# Patient Record
Sex: FEMALE | Race: WHITE | NOT HISPANIC OR LATINO
[De-identification: names, ages, dates, MRNs, and addresses within clinical notes are randomized per-mention and may not be internally consistent; named-entity substitution may affect disease eponyms.]

---
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
## 2023-01-12 ENCOUNTER — APPOINTMENT (OUTPATIENT)
Dept: PEDIATRICS | Facility: CLINIC | Age: 18
End: 2023-01-12
Payer: COMMERCIAL

## 2023-01-12 VITALS
SYSTOLIC BLOOD PRESSURE: 108 MMHG | BODY MASS INDEX: 25.83 KG/M2 | DIASTOLIC BLOOD PRESSURE: 68 MMHG | WEIGHT: 155 LBS | HEART RATE: 84 BPM | TEMPERATURE: 97.2 F | HEIGHT: 65 IN | RESPIRATION RATE: 18 BRPM

## 2023-01-12 DIAGNOSIS — Z00.00 ENCOUNTER FOR GENERAL ADULT MEDICAL EXAMINATION W/OUT ABNORMAL FINDINGS: ICD-10-CM

## 2023-01-12 DIAGNOSIS — J02.9 ACUTE PHARYNGITIS, UNSPECIFIED: ICD-10-CM

## 2023-01-12 LAB — S PYO AG SPEC QL IA: NORMAL

## 2023-01-12 PROCEDURE — 92551 PURE TONE HEARING TEST AIR: CPT

## 2023-01-12 PROCEDURE — 99395 PREV VISIT EST AGE 18-39: CPT

## 2023-01-12 PROCEDURE — 96127 BRIEF EMOTIONAL/BEHAV ASSMT: CPT

## 2023-01-12 PROCEDURE — 96160 PT-FOCUSED HLTH RISK ASSMT: CPT | Mod: 59

## 2023-01-12 PROCEDURE — 87880 STREP A ASSAY W/OPTIC: CPT | Mod: QW

## 2023-01-12 RX ORDER — PANTOPRAZOLE 40 MG/1
40 TABLET, DELAYED RELEASE ORAL DAILY
Refills: 0 | Status: COMPLETED | COMMUNITY
Start: 2022-11-23 | End: 2023-01-12

## 2023-01-12 RX ORDER — MECLIZINE HCL 25 MG/1
25 TABLET ORAL
Refills: 0 | Status: COMPLETED | COMMUNITY
Start: 2022-11-23 | End: 2023-01-12

## 2023-01-12 RX ORDER — FAMOTIDINE 40 MG/1
40 TABLET, FILM COATED ORAL DAILY
Refills: 0 | Status: COMPLETED | COMMUNITY
Start: 2022-11-23 | End: 2023-01-12

## 2023-01-12 RX ORDER — MESALAMINE 1.2 G/1
1.2 TABLET, DELAYED RELEASE ORAL
Refills: 0 | Status: COMPLETED | COMMUNITY
Start: 2022-11-23 | End: 2023-01-12

## 2023-01-12 NOTE — RISK ASSESSMENT
[No Increased risk of SCA or SCD] : No Increased risk of SCA or SCD    [0] : 2) Feeling down, depressed, or hopeless: Not at all (0) [PHQ-2 Negative - No further assessment needed] : PHQ-2 Negative - No further assessment needed [[id number]] : 0 [Have you ever fainted, passed out or had an unexplained seizure suddenly and without warning, especially during exercise or in response] : Have you ever fainted, passed out or had an unexplained seizure suddenly and without warning, especially during exercise or in response to sudden loud noises such as doorbells, alarm clocks and ringing telephones? No [Have you ever had exercise-related chest pain or shortness of breath?] : Have you ever had exercise-related chest pain or shortness of breath? No [Has anyone in your immediate family (parents, grandparents, siblings) or other more distant relatives (aunts, uncles, cousins)  of heart] : Has anyone in your immediate family (parents, grandparents, siblings) or other more distant relatives (aunts, uncles, cousins)  of heart problems or had an unexpected sudden death before age 50 (This would include unexpected drownings, unexplained car accidents in which the relative was driving or sudden infant death syndrome.)? No [Are you related to anyone with hypertrophic cardiomyopathy or hypertrophic obstructive cardiomyopathy, Marfan syndrome, arrhythmogenic] : Are you related to anyone with hypertrophic cardiomyopathy or hypertrophic obstructive cardiomyopathy, Marfan syndrome, arrhythmogenic right ventricular cardiomyopathy, long QT syndrome, short QT syndrome, Brugada syndrome or catecholaminergic polymorphic ventricular tachycardia, or anyone younger than 50 years with a pacemaker or implantable defibrillator? No

## 2023-01-12 NOTE — DISCUSSION/SUMMARY
[FreeTextEntry1] : Routine visit for this child. Doing well. Diet discussed. Exercise discussed. Safety issues discussed. Development for age discussed. Immunizations are up to date. Routine blood work ordered. All questions answered.\par 17 year female here for well visit. Normal growth and development observed. Recommend balanced diet with all food groups. Brush teeth twice daily and recommend visiting dentist twice per year for cleaning. Maintain consistent daily routines and sleep schedule. Personal hygiene, puberty, and sexual health reviewed. Risky behaviors assessed. School progress discussed. Limit screen time to less than 2 hours per day. Encourage physical activity.  Return 1 year for routine well visit.\par I recommended that the patient participates in 60 minutes or more of physical activity a day.  As an older child, I encouraged structured physical activity when possible (participation in team or individual sports, or supervised exercise sessions). .  \par Sore throat today. Strep test negative. Supportive care.\par DIscussion on all of Sowmya's medical issues. Grandmother concerned about study abroad in [place] she is considering in August. Advised to see if sx have improved by spring/summer.

## 2023-01-12 NOTE — REVIEW OF SYSTEMS
[Sore Throat] : sore throat [Appetite Changes] : appetite changes [Negative] : Gastrointestinal [Constipation] : no constipation [Abdominal Pain] : no abdominal pain

## 2023-01-12 NOTE — HISTORY OF PRESENT ILLNESS
[Yes] : Patient goes to dentist yearly [Up to date] : Up to date [Eats meals with family] : eats meals with family [Has family members/adults to turn to for help] : has family members/adults to turn to for help [Is permitted and is able to make independent decisions] : Is permitted and is able to make independent decisions [Grade: ____] : Grade: [unfilled] [Normal Performance] : normal performance [Normal Behavior/Attention] : normal behavior/attention [Normal Homework] : normal homework [Eats regular meals including adequate fruits and vegetables] : eats regular meals including adequate fruits and vegetables [Drinks non-sweetened liquids] : drinks non-sweetened liquids  [Calcium source] : calcium source [Has friends] : has friends [At least 1 hour of physical activity a day] : at least 1 hour of physical activity a day [Uses safety belts/safety equipment] : uses safety belts/safety equipment  [Has peer relationships free of violence] : has peer relationships free of violence [Has ways to cope with stress] : has ways to cope with stress [Displays self-confidence] : displays self-confidence [Gets depressed, anxious, or irritable/has mood swings] : gets depressed, anxious, or irritable/has mood swings [With Teen] : teen [Normal] : normal [LMP: _____] : LMP: [unfilled] [Cycle Length: _____ days] : Cycle Length: [unfilled] days [Days of Bleeding: _____] : Days of bleeding: [unfilled] [Age of Menarche: ____] : Age of Menarche: [unfilled] [Has concerns about body or appearance] : has concerns about body or appearance [Has interests/participates in community activities/volunteers] : has interests/participates in community activities/volunteers. [No] : Patient has not had sexual intercourse. [Irregular menses] : no irregular menses [Sleep Concerns] : no sleep concerns [Screen time (except homework) less than 2 hours a day] : no screen time (except homework) less than 2 hours a day [Uses electronic nicotine delivery system] : does not use electronic nicotine delivery system [Exposure to electronic nicotine delivery system] : no exposure to electronic nicotine delivery system [Uses tobacco] : does not use tobacco [Exposure to tobacco] : no exposure to tobacco [Uses drugs] : does not use drugs  [Exposure to drugs] : no exposure to drugs [Drinks alcohol] : does not drink alcohol [Exposure to alcohol] : no exposure to alcohol [Impaired/distracted driving] : no impaired/distracted driving [Has problems with sleep] : does not have problems with sleep [Has thought about hurting self or considered suicide] : has not thought about hurting self or considered suicide [FreeTextEntry7] : [employer]SC[employer]  17 year female   here for routine visit.  [de-identified] : ongoing sx of nausea, light headed ness, chest pain [FreeTextEntry8] : bcp for 1 month sx have improved. being followed for possible endometriosis [FreeTextEntry3] : dance [de-identified] : followed by psychiatrist for anxiety on Lexapro [FreeTextEntry1] : Patient is here today for a routine well visit. Denies any new visits to specialists,ER visits, hospitalizations or serious injuries since last visit unless listed below.\megan Patient is followed by gastroenterology for multiple complaints.  She has had multiple studies done by different gastroenterologist as well as visits to the emergency room.  Work-up included CT scan CT scan with angiogram, LE Doppler study, ultrasound of the gallbladder, upper GI, HIDA scan,.  Work-up to date has not revealed a source for her symptoms.  Patient was also s/p cardiac ablation for WPW in August 2022.  Followed by cardiology.  Followed b psych and is currently on Lexapro.\megan COntinues to have intermittent light headedness,nausea and chest pain which has been diagnosed as costochondritis.\megan Recently had a colonoscopy which was negative. Head CT scan for headache which was negative and diagnosed with migraine.\megan Also followed by GYN for ?? endometriosis due to painful and heavy periods. Now sx have improved while on the BCP.\megan Has tried Acupuncture and Reke with no improvement

## 2023-01-16 LAB — BACTERIA THROAT CULT: NORMAL

## 2023-01-18 NOTE — REVIEW OF SYSTEMS
Subjective:       Patient ID: Rl Grubbs is a 47 y.o. male.    Chief Complaint: palate ulcer    HPI    Rl Grubbs is a 47 year old male who presents to the head and neck clinic for a lesion on his palate. He first developed globus sensation this weekend. He had mild dysphagia. Today he noticed an ulcer to his palate above his uvula. It is mildly tender. There is no bleeding. No other lesions. He has no throat pain. He does have chronic PND and frequent sinusitis. He takes Zyrtec daily.     He has a known history of sleep apnea and uses a CPAP machine. He is interested in pursuing other treatment options for sleep apnea.    No past medical history on file.    Past Surgical History:   Procedure Laterality Date    CHOLECYSTECTOMY  7-28-21    HERNIA REPAIR  7-    ROBOT-ASSISTED CHOLECYSTECTOMY USING DA LORENE XI N/A 07/28/2021    Procedure: XI ROBOTIC CHOLECYSTECTOMY;  Surgeon: Brett Gastelum MD;  Location: [facility] OR [address] [place] [address];  Service: General;  Laterality: N/A;    ROBOT-ASSISTED LAPAROSCOPIC REPAIR OF EPIGASTRIC HERNIA N/A 07/28/2021    Procedure: ROBOTIC REPAIR, HERNIA, EPIGASTRIC;  Surgeon: Brett Gastelum MD;  Location: [facility] OR [address] [place] [address];  Service: General;  Laterality: N/A;  Incarcerated visceral hernia repair           Current Outpatient Medications:     buPROPion (WELLBUTRIN XL) 300 MG 24 hr tablet, Take 1 tablet (300 mg total) by mouth once daily., Disp: 90 tablet, Rfl: 3    cetirizine (ZYRTEC) 10 MG tablet, Take 10 mg by mouth once daily., Disp: , Rfl:     esomeprazole (NEXIUM) 40 MG capsule, TAKE 1 CAPSULE(40 MG) BY MOUTH EVERY DAY, Disp: 90 capsule, Rfl: 3    fenofibrate (TRICOR) 145 MG tablet, Take 1 tablet (145 mg total) by mouth once daily., Disp: 90 tablet, Rfl: 3    fluticasone propionate (FLONASE) 50 mcg/actuation nasal spray, 2 sprays (100 mcg total) by Each Nostril route once daily., Disp: 16 g, Rfl: 12    ibuprofen (ADVIL,MOTRIN) 800 MG tablet, Take 1 tablet (800 mg total) by  mouth every 6 (six) hours as needed for Pain., Disp: 60 tablet, Rfl: 0    irbesartan (AVAPRO) 300 MG tablet, TAKE 1 TABLET(300 MG) BY MOUTH EVERY DAY, Disp: 90 tablet, Rfl: 3    methylPREDNISolone (MEDROL DOSEPACK) 4 mg tablet, use as directed, Disp: 21 each, Rfl: 0    omega 3-dha-epa-fish oil 1,000 mg (120 mg-180 mg) Cap, Take 1,000 mg by mouth once daily., Disp: , Rfl:     Review of patient's allergies indicates:  No Known Allergies    Social History     Socioeconomic History    Marital status:    Tobacco Use    Smoking status: Never    Smokeless tobacco: Never   Substance and Sexual Activity    Alcohol use: Yes     Alcohol/week: 6.0 standard drinks     Types: 6 Cans of beer per week     Comment: Socially    Drug use: Never    Sexual activity: Yes     Partners: Female     Birth control/protection: Post-menopausal, None     Social Determinants of Health     Financial Resource Strain: Low Risk     Difficulty of Paying Living Expenses: Not hard at all   Food Insecurity: No Food Insecurity    Worried About Running Out of Food in the Last Year: Never true    Ran Out of Food in the Last Year: Never true   Transportation Needs: No Transportation Needs    Lack of Transportation (Medical): No    Lack of Transportation (Non-Medical): No   Physical Activity: Sufficiently Active    Days of Exercise per Week: 3 days    Minutes of Exercise per Session: 60 min   Stress: Stress Concern Present    Feeling of Stress : To some extent   Social Connections: Unknown    Frequency of Communication with Friends and Family: Twice a week    Frequency of Social Gatherings with Friends and Family: Once a week    Active Member of Clubs or Organizations: No    Attends Club or Organization Meetings: Patient refused    Marital Status:    Housing Stability: Low Risk     Unable to Pay for Housing in the Last Year: No    Number of Places Lived in the Last Year: 1    Unstable Housing in the Last Year: No       Family History   Problem  Relation Age of Onset    Asthma Mother     COPD Mother     Hearing loss Mother     Heart disease Mother     Hypertension Mother     Kidney disease Mother     Cancer Father     Heart disease Brother     Hypertension Brother     Diabetes Brother          Review of Systems   Constitutional:  Negative for appetite change, chills, diaphoresis, fatigue, fever and unexpected weight change.   HENT:  Positive for mouth sores and trouble swallowing. Negative for nasal congestion, dental problem, drooling, ear discharge, ear pain, facial swelling, hearing loss, nosebleeds, postnasal drip, rhinorrhea, sinus pressure/congestion, sneezing, sore throat, tinnitus and voice change.    Eyes: Negative.  Negative for pain, discharge, redness and itching.   Respiratory:  Negative for cough and shortness of breath.    Cardiovascular: Negative.  Negative for chest pain.   Gastrointestinal:  Negative for abdominal distention, abdominal pain, diarrhea, nausea and vomiting.   Endocrine: Negative.  Negative for cold intolerance and heat intolerance.   Genitourinary: Negative.  Negative for difficulty urinating.   Musculoskeletal:  Positive for back pain. Negative for neck pain and neck stiffness.   Integumentary:  Negative for rash. Negative.   Neurological: Negative.  Negative for dizziness, weakness and headaches.   Hematological: Negative.  Negative for adenopathy.   Psychiatric/Behavioral:  The patient is nervous/anxious.        Objective:      Physical Exam  Vitals reviewed.   Constitutional:       General: He is not in acute distress.     Appearance: He is well-developed. He is not ill-appearing or diaphoretic.   HENT:      Head: Normocephalic and atraumatic.      Jaw: No trismus.      Right Ear: Hearing, tympanic membrane, ear canal and external ear normal.      Left Ear: Hearing, tympanic membrane, ear canal and external ear normal.      Nose: Nose normal. No nasal deformity, mucosal edema or rhinorrhea.      Right Sinus: No maxillary  sinus tenderness or frontal sinus tenderness.      Left Sinus: No maxillary sinus tenderness or frontal sinus tenderness.      Mouth/Throat:      Lips: Pink. No lesions.      Mouth: Mucous membranes are moist. Mucous membranes are not pale, not dry and not cyanotic. No oral lesions.      Dentition: Normal dentition. Does not have dentures. No dental caries.      Tongue: No lesions. Tongue does not deviate from midline.      Palate: No mass and lesions.      Pharynx: Uvula midline. No pharyngeal swelling, oropharyngeal exudate, posterior oropharyngeal erythema or uvula swelling.      Tonsils: No tonsillar abscesses.     Eyes:      General: No scleral icterus.        Right eye: No discharge.         Left eye: No discharge.      Conjunctiva/sclera: Conjunctivae normal.   Neck:      Thyroid: No thyroid mass or thyromegaly.      Vascular: No JVD.      Trachea: Trachea and phonation normal. No tracheal tenderness or tracheal deviation.      Comments: Salivary glands - there are no lesions or asymmetric findings in the submandibular or parotid glands    Cardiovascular:      Rate and Rhythm: Normal rate.   Pulmonary:      Effort: Pulmonary effort is normal. No respiratory distress.      Breath sounds: No stridor.   Musculoskeletal:      Cervical back: Normal range of motion and neck supple.   Lymphadenopathy:      Head:      Right side of head: No submental, submandibular, tonsillar or preauricular adenopathy.      Left side of head: No submental, submandibular, tonsillar or preauricular adenopathy.      Cervical: No cervical adenopathy.   Skin:     General: Skin is warm and dry.      Coloration: Skin is not pale.      Findings: No erythema or rash.   Neurological:      Mental Status: He is alert and oriented to person, place, and time.      Cranial Nerves: No cranial nerve deficit.   Psychiatric:         Behavior: Behavior normal. Behavior is cooperative.         Thought Content: Thought content normal.        Assessment/Plan:       Problem List Items Addressed This Visit          ENT    Post-nasal drip     Flonase prescribed.         Ulcer of soft palate     Benign appearing, medrol dose pack prescribed. RTC in 3 weeks if ulcer persists.            GI    Gastroesophageal reflux disease - Primary     Continue Nexium, consider GI follow up if breakthrough reflux persists.              [Change in Activity] : no change in activity [Fever] : no fever [Wgt Loss (___ Lbs)] : no recent weight loss [Eye Discharge] : no eye discharge [Redness] : no redness [Swollen Eyelids] : no swollen eyelids [Change in Vision] : no change in vision  [Nasal Stuffiness] : no nasal congestion [Sore Throat] : no sore throat [Earache] : no earache [Nosebleeds] : no epistaxis [Cyanosis] : no cyanosis [Edema] : no edema [Diaphoresis] : not diaphoretic [Exercise Intolerance] : no persistence of exercise intolerance [Chest Pain] : no chest pain or discomfort [Palpitations] : no palpitations [Tachypnea] : not tachypneic [Wheezing] : no wheezing [Cough] : no cough [Shortness of Breath] : no shortness of breath [Change in Appetite] : no change in appetite [Vomiting] : no vomiting [Diarrhea] : no diarrhea [Abdominal Pain] : no abdominal pain [Constipation] : no constipation [Fainting (Syncope)] : no fainting [Seizure] : no seizures [Headache] : no headache [Dizziness] : no dizziness [Limping] : no limping [Joint Pains] : no arthralgias [Joint Swelling] : no joint swelling [Back Pain] : ~T no back pain [Muscle Aches] : no muscle aches [Rash] : no rash [Insect Bites] : no insect bites [Skin Lesions] : no skin lesions [Bruising] : no tendency for easy bruising [Swollen Glands] : no lymphadenopathy [Sleep Disturbances] : ~T no sleep disturbances [Hyperactive] : no hyperactive behavior [Emotional Problems] : no ~T emotional problems [Change In Personality] : ~T no personality change [Dec Urine Output] : no oliguria [Urinary Frequency] : no change in urinary frequency [Pain During Urination (Dysuria)] : no dysuria [Vaginal Discharge] : no vaginal discharge [Pubertal Concerns] : no pubertal concerns [Delayed Menarche] : no delayed menarche [Irregular Periods] : no irregular periods

## 2023-03-15 ENCOUNTER — NON-APPOINTMENT (OUTPATIENT)
Age: 18
End: 2023-03-15

## 2023-03-15 LAB
ALBUMIN SERPL ELPH-MCNC: 4.9 G/DL
ALP BLD-CCNC: 84 U/L
ALT SERPL-CCNC: 10 U/L
ANION GAP SERPL CALC-SCNC: 14 MMOL/L
APPEARANCE: ABNORMAL
AST SERPL-CCNC: 17 U/L
BACTERIA: NEGATIVE
BASOPHILS # BLD AUTO: 0.05 K/UL
BASOPHILS NFR BLD AUTO: 0.6 %
BILIRUB SERPL-MCNC: 0.6 MG/DL
BILIRUBIN URINE: NEGATIVE
BLOOD URINE: ABNORMAL
BUN SERPL-MCNC: 10 MG/DL
CALCIUM SERPL-MCNC: 9.9 MG/DL
CHLORIDE SERPL-SCNC: 104 MMOL/L
CHOLEST SERPL-MCNC: 173 MG/DL
CO2 SERPL-SCNC: 21 MMOL/L
COLOR: YELLOW
CREAT SERPL-MCNC: 0.88 MG/DL
EGFR: 97 ML/MIN/1.73M2
EOSINOPHIL # BLD AUTO: 0.11 K/UL
EOSINOPHIL NFR BLD AUTO: 1.2 %
GLUCOSE QUALITATIVE U: NEGATIVE
GLUCOSE SERPL-MCNC: 123 MG/DL
HCT VFR BLD CALC: 39.2 %
HDLC SERPL-MCNC: 36 MG/DL
HGB BLD-MCNC: 13.1 G/DL
HYALINE CASTS: 0 /LPF
IMM GRANULOCYTES NFR BLD AUTO: 0.4 %
KETONES URINE: NORMAL
LDLC SERPL CALC-MCNC: 114 MG/DL
LEUKOCYTE ESTERASE URINE: ABNORMAL
LYMPHOCYTES # BLD AUTO: 2.86 K/UL
LYMPHOCYTES NFR BLD AUTO: 32 %
MAN DIFF?: NORMAL
MCHC RBC-ENTMCNC: 30.3 PG
MCHC RBC-ENTMCNC: 33.4 GM/DL
MCV RBC AUTO: 90.5 FL
MICROSCOPIC-UA: NORMAL
MONOCYTES # BLD AUTO: 0.49 K/UL
MONOCYTES NFR BLD AUTO: 5.5 %
NEUTROPHILS # BLD AUTO: 5.38 K/UL
NEUTROPHILS NFR BLD AUTO: 60.3 %
NITRITE URINE: NEGATIVE
NONHDLC SERPL-MCNC: 138 MG/DL
PH URINE: 6
PLATELET # BLD AUTO: 397 K/UL
POTASSIUM SERPL-SCNC: 4.6 MMOL/L
PROT SERPL-MCNC: 7.7 G/DL
PROTEIN URINE: NORMAL
RBC # BLD: 4.33 M/UL
RBC # FLD: 12.8 %
RED BLOOD CELLS URINE: 3 /HPF
SODIUM SERPL-SCNC: 140 MMOL/L
SPECIFIC GRAVITY URINE: 1.03
SQUAMOUS EPITHELIAL CELLS: 9 /HPF
T4 SERPL-MCNC: 9.8 UG/DL
TRIGL SERPL-MCNC: 117 MG/DL
TSH SERPL-ACNC: 1.24 UIU/ML
URINE COMMENTS: NORMAL
UROBILINOGEN URINE: NORMAL
WBC # FLD AUTO: 8.93 K/UL
WHITE BLOOD CELLS URINE: 13 /HPF

## 2023-03-20 ENCOUNTER — NON-APPOINTMENT (OUTPATIENT)
Age: 18
End: 2023-03-20

## 2023-04-04 ENCOUNTER — NON-APPOINTMENT (OUTPATIENT)
Age: 18
End: 2023-04-04

## 2023-04-04 ENCOUNTER — APPOINTMENT (OUTPATIENT)
Dept: PEDIATRICS | Facility: CLINIC | Age: 18
End: 2023-04-04
Payer: COMMERCIAL

## 2023-04-04 DIAGNOSIS — J01.10 ACUTE FRONTAL SINUSITIS, UNSPECIFIED: ICD-10-CM

## 2023-04-04 DIAGNOSIS — J32.9 CHRONIC SINUSITIS, UNSPECIFIED: ICD-10-CM

## 2023-04-04 DIAGNOSIS — R09.81 NASAL CONGESTION: ICD-10-CM

## 2023-04-04 DIAGNOSIS — R51.9 HEADACHE, UNSPECIFIED: ICD-10-CM

## 2023-04-04 DIAGNOSIS — J34.89 NASAL CONGESTION: ICD-10-CM

## 2023-04-04 PROCEDURE — 99442: CPT

## 2023-04-06 ENCOUNTER — NON-APPOINTMENT (OUTPATIENT)
Age: 18
End: 2023-04-06

## 2023-04-08 ENCOUNTER — APPOINTMENT (OUTPATIENT)
Dept: PEDIATRICS | Facility: CLINIC | Age: 18
End: 2023-04-08

## 2023-05-13 ENCOUNTER — APPOINTMENT (OUTPATIENT)
Dept: PEDIATRICS | Facility: CLINIC | Age: 18
End: 2023-05-13
Payer: COMMERCIAL

## 2023-05-13 VITALS — TEMPERATURE: 98.6 F | HEART RATE: 79 BPM | SYSTOLIC BLOOD PRESSURE: 112 MMHG | DIASTOLIC BLOOD PRESSURE: 78 MMHG

## 2023-05-13 DIAGNOSIS — R51.9 HEADACHE, UNSPECIFIED: ICD-10-CM

## 2023-05-13 PROCEDURE — 99213 OFFICE O/P EST LOW 20 MIN: CPT

## 2023-05-13 RX ORDER — NORETHINDRONE ACETATE 5 MG/1
5 TABLET ORAL DAILY
Qty: 30 | Refills: 0 | Status: ACTIVE | COMMUNITY
Start: 2023-05-13

## 2023-05-13 NOTE — DISCUSSION/SUMMARY
[FreeTextEntry1] : This is a [age]yearsFormEnd who has headache on and off for the last 3 weeks describes pain as a dull ache over frontal area to top of head Mom has been doing physical therapy with her due to some laxity of her muscles.\par Patient had history of 1 migraine in the past this episode does not sound like migraine headache.  Patient has been taking Tylenol for pain.\par To change from Tylenol to Motrin or Advil for headache pain\par Recommend keeping headache diary advised if headache continues to persist will need neurology evaluation..\par Examination today within normal limits except for boggy nasal mucosa advised to start Nasonex as directed by allergy immunology and also to try Allegra-D or Claritin-D for decongestion.\par Answered all mom's questions\par Total time dedicated to this patient visit , including preparing to see the patient ( eg.. Review of chart, any pertinent labs ect  ) obtaining and/ or  reviewing separately obtained history, performing medical exam,  evaluation, counseling and educating patient and family member, ordering any needed medication or labs and documenting clinical information in  the electronic medical record to patient / parent ____20__ minutes.\par .

## 2023-05-13 NOTE — HISTORY OF PRESENT ILLNESS
[FreeTextEntry6] : 16 y/o present with headache X 1 month . Afebrile.\par Patient has returned from college states that she has had headache pretty much daily for the last 3 weeks.\par Has history of migraine where she had difficulty with vision and numbness to side of her face she had CT scan done which was negative patient received Compazine Tylenol and Benadryl in the emergency room headache to this extent has not reoccurred.\par Patient states that she has no nausea or vomiting no photophobia\par Will some tightness on the top of her neck and head.\par Patient saw allergy immunology work-up was negative as per mother patient was recommended to start on Nasonex nasal spray but the has not done this of yet.  Stated she has no allergies

## 2023-05-13 NOTE — PHYSICAL EXAM
[Clear] : right tympanic membrane clear [Inflamed Nasal Mucosa] : inflamed nasal mucosa [NL] : warm, clear [FreeTextEntry5] : Pupils are equal and reactive [FreeTextEntry4] : Patient noted to have some erythema septum there is no discharge noted

## 2023-05-18 ENCOUNTER — APPOINTMENT (OUTPATIENT)
Dept: PEDIATRICS | Facility: CLINIC | Age: 18
End: 2023-05-18
Payer: COMMERCIAL

## 2023-05-18 VITALS — TEMPERATURE: 98.2 F

## 2023-05-18 PROCEDURE — 90715 TDAP VACCINE 7 YRS/> IM: CPT

## 2023-05-18 PROCEDURE — 90471 IMMUNIZATION ADMIN: CPT

## 2023-05-18 NOTE — HISTORY OF PRESENT ILLNESS
[Tdap] : Tdap [FreeTextEntry1] : 17 year girl is here today for immunization update. patient is doing well. Vaccine listed discussed and given. VIS given. Possible side effects discussed. Received\par tdap

## 2023-05-18 NOTE — DISCUSSION/SUMMARY
[] : The components of the vaccine(s) to be administered today are listed in the plan of care. The disease(s) for which the vaccine(s) are intended to prevent and the risks have been discussed with the caretaker.  The risks are also included in the appropriate vaccination information statements which have been provided to the patient's caregiver.  The caregiver has given consent to vaccinate. [FreeTextEntry1] : 17 year girl is here today for immunization update. patient is doing well. Vaccine listed discussed and given. VIS given. Possible side effects discussed. Received\par tdap

## 2023-07-03 ENCOUNTER — APPOINTMENT (OUTPATIENT)
Dept: PEDIATRICS | Facility: CLINIC | Age: 18
End: 2023-07-03

## 2023-11-28 ENCOUNTER — APPOINTMENT (OUTPATIENT)
Dept: PEDIATRICS | Facility: CLINIC | Age: 18
End: 2023-11-28

## 2023-12-08 ENCOUNTER — APPOINTMENT (OUTPATIENT)
Dept: PEDIATRICS | Facility: CLINIC | Age: 18
End: 2023-12-08
Payer: COMMERCIAL

## 2023-12-08 VITALS — TEMPERATURE: 97.8 F

## 2023-12-08 DIAGNOSIS — R51.9 HEADACHE, UNSPECIFIED: ICD-10-CM

## 2023-12-08 DIAGNOSIS — J34.89 OTHER SPECIFIED DISORDERS OF NOSE AND NASAL SINUSES: ICD-10-CM

## 2023-12-08 PROCEDURE — 99214 OFFICE O/P EST MOD 30 MIN: CPT

## 2023-12-10 ENCOUNTER — NON-APPOINTMENT (OUTPATIENT)
Age: 18
End: 2023-12-10

## 2023-12-15 ENCOUNTER — APPOINTMENT (OUTPATIENT)
Dept: PEDIATRICS | Facility: CLINIC | Age: 18
End: 2023-12-15

## 2024-03-09 ENCOUNTER — APPOINTMENT (OUTPATIENT)
Dept: PEDIATRICS | Facility: CLINIC | Age: 19
End: 2024-03-09
Payer: COMMERCIAL

## 2024-03-09 VITALS — TEMPERATURE: 97.9 F

## 2024-03-09 DIAGNOSIS — J45.991 COUGH VARIANT ASTHMA: ICD-10-CM

## 2024-03-09 DIAGNOSIS — H65.01 ACUTE SEROUS OTITIS MEDIA, RIGHT EAR: ICD-10-CM

## 2024-03-09 PROCEDURE — 99214 OFFICE O/P EST MOD 30 MIN: CPT

## 2024-03-09 RX ORDER — FLUTICASONE PROPIONATE AND SALMETEROL 50; 500 UG/1; UG/1
500-50 POWDER RESPIRATORY (INHALATION) DAILY
Refills: 0 | Status: ACTIVE | COMMUNITY
Start: 2024-03-09

## 2024-03-09 RX ORDER — AMOXICILLIN AND CLAVULANATE POTASSIUM 875; 125 MG/1; MG/1
875-125 TABLET, COATED ORAL
Qty: 20 | Refills: 0 | Status: ACTIVE | COMMUNITY
Start: 2024-03-09 | End: 1900-01-01

## 2024-03-09 RX ORDER — CEFDINIR 300 MG/1
300 CAPSULE ORAL
Qty: 20 | Refills: 0 | Status: DISCONTINUED | COMMUNITY
Start: 2023-04-04 | End: 2024-03-09

## 2024-03-09 RX ORDER — ALBUTEROL SULFATE 90 UG/1
108 (90 BASE) INHALANT RESPIRATORY (INHALATION)
Refills: 0 | Status: ACTIVE | COMMUNITY
Start: 2024-03-09

## 2024-03-09 NOTE — HISTORY OF PRESENT ILLNESS
[FreeTextEntry6] : 19 y/o presents with pain in her right ear X 7 days, Afebrile. While away at college she was started on Augmentin (she believes it was 500 mg) twice daily x 7 days, she was feeling better until she finished the medication and her ear pain came right back. They then put her on Omnicef 300 mg BID and she is about 7 days into it with no relief of ear pain. It wakes her up at night. She had sinus balloon surgery in January. She is home this week and will be following up with ENT as well as pulmonologist. She was diagnosed with asthma, she is on advair and albuterol, also was placed on a prednisone taper for her cough. She took 30 mg today and has 2 more days of the taper left. She has not had fever.

## 2024-03-09 NOTE — DISCUSSION/SUMMARY
[FreeTextEntry1] : 18 year female with right AOM.  Complete high dose Augmentin BID x 10 days as prescribed. Provide antipyretics as needed for pain or fever.  Encourage fluids and rest.  If no improvement or symptoms worsen within 48 hours return for re-evaluation. Return to office for follow up in 2-3 wks. Follow up with ENT next week as well as pulmonologist. Continue advair, albuterol, prednisone. May consider a decongestant to help with sinus pressure.  Total time dedicated to this patient's visit includes preparing to see patient (reviewing chart, any pertinent labs/consults, etc.), obtaining and/or reviewing separately obtained history from patient and parent, performing medical examination, evaluation, counseling and educating patient/parent, ordering any needed medications and/or labs, documenting clinical information in the electronic record, reviewing any results subsequent to the orders placed during visit and discussing with patient/parent. Total time spent: 30 minutes.

## 2024-03-09 NOTE — REVIEW OF SYSTEMS
[Fever] : no fever [Ear Pain] : ear pain [Nasal Congestion] : nasal congestion [Sinus Pressure] : sinus pressure [Cough] : cough [Negative] : Genitourinary

## 2024-03-09 NOTE — PHYSICAL EXAM
[Tenderness] : tenderness [Clear] : left tympanic membrane clear [Erythema] : erythema [Purulent Effusion] : purulent effusion [Hypertrophied Nasal Mucosa] : hypertrophied nasal mucosa [NL] : regular rate and rhythm, normal S1, S2 audible, no murmurs [FreeTextEntry2] : frontal sinus tenderness

## 2024-05-29 ENCOUNTER — APPOINTMENT (OUTPATIENT)
Dept: PEDIATRICS | Facility: CLINIC | Age: 19
End: 2024-05-29

## 2024-11-29 ENCOUNTER — OUTPATIENT (OUTPATIENT)
Dept: OUTPATIENT SERVICES | Facility: HOSPITAL | Age: 19
LOS: 1 days | End: 2024-11-29
Payer: COMMERCIAL

## 2024-11-29 ENCOUNTER — APPOINTMENT (OUTPATIENT)
Dept: PEDIATRICS | Facility: CLINIC | Age: 19
End: 2024-11-29
Payer: COMMERCIAL

## 2024-11-29 ENCOUNTER — APPOINTMENT (OUTPATIENT)
Dept: ULTRASOUND IMAGING | Facility: CLINIC | Age: 19
End: 2024-11-29
Payer: COMMERCIAL

## 2024-11-29 VITALS — WEIGHT: 176.4 LBS | TEMPERATURE: 98.7 F

## 2024-11-29 DIAGNOSIS — R22.1 LOCALIZED SWELLING, MASS AND LUMP, NECK: ICD-10-CM

## 2024-11-29 DIAGNOSIS — I88.9 NONSPECIFIC LYMPHADENITIS, UNSPECIFIED: ICD-10-CM

## 2024-11-29 DIAGNOSIS — R93.89 ABNORMAL FINDINGS ON DIAGNOSTIC IMAGING OF OTHER SPECIFIED BODY STRUCTURES: ICD-10-CM

## 2024-11-29 DIAGNOSIS — Z90.89 ACQUIRED ABSENCE OF OTHER ORGANS: Chronic | ICD-10-CM

## 2024-11-29 DIAGNOSIS — S32.2XXA FRACTURE OF COCCYX, INITIAL ENCOUNTER FOR CLOSED FRACTURE: Chronic | ICD-10-CM

## 2024-11-29 PROCEDURE — 76536 US EXAM OF HEAD AND NECK: CPT | Mod: 26

## 2024-11-29 PROCEDURE — 99214 OFFICE O/P EST MOD 30 MIN: CPT

## 2024-11-29 PROCEDURE — 76536 US EXAM OF HEAD AND NECK: CPT

## 2024-11-29 RX ORDER — DOXYCYCLINE HYCLATE 100 MG/1
100 TABLET ORAL
Qty: 14 | Refills: 0 | Status: ACTIVE | COMMUNITY
Start: 2024-11-29 | End: 1900-01-01

## 2024-11-30 ENCOUNTER — TRANSCRIPTION ENCOUNTER (OUTPATIENT)
Age: 19
End: 2024-11-30

## 2024-11-30 LAB
ALBUMIN SERPL ELPH-MCNC: 4.7 G/DL
ALP BLD-CCNC: 117 U/L
ALT SERPL-CCNC: 26 U/L
ANION GAP SERPL CALC-SCNC: 13 MMOL/L
AST SERPL-CCNC: 26 U/L
BASOPHILS # BLD AUTO: 0.05 K/UL
BASOPHILS NFR BLD AUTO: 0.5 %
BILIRUB SERPL-MCNC: 0.6 MG/DL
BUN SERPL-MCNC: 9 MG/DL
CALCIUM SERPL-MCNC: 9.7 MG/DL
CHLORIDE SERPL-SCNC: 104 MMOL/L
CO2 SERPL-SCNC: 23 MMOL/L
CREAT SERPL-MCNC: 0.74 MG/DL
CRP SERPL-MCNC: <3 MG/L
EBV EA AB SER IA-ACNC: 29.2 U/ML
EBV EA AB TITR SER IF: POSITIVE
EBV EA IGG SER QL IA: 507 U/ML
EBV EA IGG SER-ACNC: POSITIVE
EBV EA IGM SER IA-ACNC: NEGATIVE
EBV PATRN SPEC IB-IMP: NORMAL
EBV VCA IGG SER IA-ACNC: >750 U/ML
EBV VCA IGM SER QL IA: 34.1 U/ML
EGFR: 118 ML/MIN/1.73M2
EOSINOPHIL # BLD AUTO: 0.26 K/UL
EOSINOPHIL NFR BLD AUTO: 2.7 %
EPSTEIN-BARR VIRUS CAPSID ANTIGEN IGG: POSITIVE
ERYTHROCYTE [SEDIMENTATION RATE] IN BLOOD BY WESTERGREN METHOD: 27 MM/HR
GLUCOSE SERPL-MCNC: 109 MG/DL
HCT VFR BLD CALC: 35.4 %
HGB BLD-MCNC: 11.6 G/DL
IMM GRANULOCYTES NFR BLD AUTO: 0.4 %
LYMPHOCYTES # BLD AUTO: 2.68 K/UL
LYMPHOCYTES NFR BLD AUTO: 27.4 %
MAN DIFF?: NORMAL
MCHC RBC-ENTMCNC: 28.9 PG
MCHC RBC-ENTMCNC: 32.8 G/DL
MCV RBC AUTO: 88.3 FL
MONOCYTES # BLD AUTO: 0.76 K/UL
MONOCYTES NFR BLD AUTO: 7.8 %
NEUTROPHILS # BLD AUTO: 5.99 K/UL
NEUTROPHILS NFR BLD AUTO: 61.2 %
PLATELET # BLD AUTO: 402 K/UL
POTASSIUM SERPL-SCNC: 4.8 MMOL/L
PROT SERPL-MCNC: 7.4 G/DL
RBC # BLD: 4.01 M/UL
RBC # FLD: 14.6 %
SODIUM SERPL-SCNC: 141 MMOL/L
T4 FREE SERPL-MCNC: 1.2 NG/DL
THYROGLOB AB SERPL-ACNC: 15.5 IU/ML
THYROPEROXIDASE AB SERPL IA-ACNC: 13.7 IU/ML
WBC # FLD AUTO: 9.78 K/UL

## 2024-12-01 LAB
M PNEUMO IGG SER IA-ACNC: POSITIVE
M PNEUMO IGG SER QL IA: 5.17 INDEX
M PNEUMO IGM SER QL IA: 2.47 INDEX
MYCOPLASMA AG SPEC QL: POSITIVE

## 2024-12-02 ENCOUNTER — NON-APPOINTMENT (OUTPATIENT)
Age: 19
End: 2024-12-02

## 2024-12-05 LAB — TSH ESOTERIX: 1.5 UU/ML

## 2025-05-01 ENCOUNTER — APPOINTMENT (OUTPATIENT)
Dept: PEDIATRICS | Facility: CLINIC | Age: 20
End: 2025-05-01
Payer: COMMERCIAL

## 2025-05-01 VITALS — TEMPERATURE: 98.2 F

## 2025-05-01 DIAGNOSIS — J02.0 STREPTOCOCCAL PHARYNGITIS: ICD-10-CM

## 2025-05-01 LAB — S PYO AG SPEC QL IA: POSITIVE

## 2025-05-01 PROCEDURE — 99214 OFFICE O/P EST MOD 30 MIN: CPT

## 2025-05-01 PROCEDURE — 87880 STREP A ASSAY W/OPTIC: CPT | Mod: QW

## 2025-05-01 RX ORDER — DROSPIRENONE 4 MG/1
4 TABLET, FILM COATED ORAL
Refills: 0 | Status: ACTIVE | COMMUNITY
Start: 2025-05-01

## 2025-05-01 RX ORDER — CEFDINIR 300 MG/1
300 CAPSULE ORAL DAILY
Qty: 20 | Refills: 0 | Status: ACTIVE | COMMUNITY
Start: 2025-05-01 | End: 1900-01-01

## 2025-05-29 ENCOUNTER — APPOINTMENT (OUTPATIENT)
Dept: PAIN MANAGEMENT | Facility: CLINIC | Age: 20
End: 2025-05-29

## 2025-06-14 ENCOUNTER — NON-APPOINTMENT (OUTPATIENT)
Age: 20
End: 2025-06-14